# Patient Record
Sex: FEMALE | Race: WHITE | Employment: FULL TIME | URBAN - METROPOLITAN AREA
[De-identification: names, ages, dates, MRNs, and addresses within clinical notes are randomized per-mention and may not be internally consistent; named-entity substitution may affect disease eponyms.]

---

## 2018-03-29 ENCOUNTER — OFFICE VISIT (OUTPATIENT)
Dept: URGENT CARE | Facility: RETAIL CLINIC | Age: 53
End: 2018-03-29
Payer: COMMERCIAL

## 2018-03-29 VITALS — DIASTOLIC BLOOD PRESSURE: 78 MMHG | TEMPERATURE: 97.5 F | HEART RATE: 63 BPM | SYSTOLIC BLOOD PRESSURE: 119 MMHG

## 2018-03-29 DIAGNOSIS — R09.81 NASAL SINUS CONGESTION: Primary | ICD-10-CM

## 2018-03-29 DIAGNOSIS — G44.52 NEW DAILY PERSISTENT HEADACHE: ICD-10-CM

## 2018-03-29 PROCEDURE — 99213 OFFICE O/P EST LOW 20 MIN: CPT | Performed by: PHYSICIAN ASSISTANT

## 2018-03-29 NOTE — MR AVS SNAPSHOT
"              After Visit Summary   3/29/2018    Regina Roblero    MRN: [id number]           Patient Information     Date Of Birth          1965        Visit Information        Provider Department      3/29/2018 7:10 PM Meagan Ewing PA-C [facility]        Today's Diagnoses     Nasal sinus congestion    -  1    New daily persistent headache           Follow-ups after your visit        Who to contact     You can reach your care team any time of the day by calling [phone].  Notification of test results:  If you have an abnormal lab result, we will notify you by phone as soon as possible.         Additional Information About Your Visit        MyChart Information     Zannel lets you send messages to your doctor, view your test results, renew your prescriptions, schedule appointments and more. To sign up, go to [email or web address] . Click on \"Log in\" on the left side of the screen, which will take you to the Welcome page. Then click on \"Sign up Now\" on the right side of the page.     You will be asked to enter the access code listed below, as well as some personal information. Please follow the directions to create your username and password.     Your access code is: [email or web address]  Expires: 2018  7:33 PM     Your access code will  in 90 days. If you need help or a new code, please call your [place] clinic or [phone].        Care EveryWhere ID     This is your [facility] EveryWhere ID. This could be used by other organizations to access your [place] medical records  [id number]        Your Vitals Were     Pulse Temperature                63 97.5  F (36.4  C) (Temporal)           Blood Pressure from Last 3 Encounters:   18 119/78   16 111/69   13 118/76    Weight from Last 3 Encounters:   No data found for Wt              Today, you had the following     No orders found for display       Primary Care Provider Office Phone # Fax #    Alin Quispe, " [phone]       [facility] [address] [place]  [place] [address]        Equal Access to Services     IVONNE LANGE : Hadii aad ku hadangelitanaldo Acosta, melepro regannatashaha, sadia jamirventura alejandro, waxpadmini adrianin hayaasoo bennettgrace dang harriet sherwood. So [facility] [phone].    ATENCIÓN: Si habla español, tiene a swann disposición servicios gratuitos de asistencia lingüística. Llame al [phone].    We comply with applicable federal civil rights laws and [place] laws. We do not discriminate on the basis of race, color, national origin, age, disability, sex, sexual orientation, or gender identity.            Thank you!     Thank you for choosing [facility] [place]  for your care. Our goal is always to provide you with excellent care. Hearing back from our patients is one way we can continue to improve our services. Please take a few minutes to complete the written survey that you may receive in the mail after your visit with us. Thank you!             Your Updated Medication List - Protect others around you: Learn how to safely use, store and throw away your medicines at www.disposemymeds.org.          This list is accurate as of 3/29/18  7:33 PM.  Always use your most recent med list.                   Brand Name Dispense Instructions for use Diagnosis    ADVIL COLD/SINUS PO           albuterol 108 (90 BASE) MCG/ACT Inhaler    PROAIR HFA/PROVENTIL HFA/VENTOLIN HFA    1 Inhaler    Inhale 2 puffs into the lungs every 6 hours as needed for shortness of breath / dyspnea.    Cough       ALEVE PO           IBUPROFEN PO      Take  by mouth.

## 2018-03-30 NOTE — NURSING NOTE
Chief Complaint   Patient presents with     Sinus Problem     x 3 1/2 week, sinus pain and pressure, bilateral ear pain x 3 days, no fevers       Initial /78 (BP Location: Left arm)  Pulse 63  Temp 97.5  F (36.4  C) (Temporal) There is no height or weight on file to calculate BMI.  Medication Reconciliation: complete

## 2019-07-24 ENCOUNTER — OFFICE VISIT (OUTPATIENT)
Dept: URGENT CARE | Facility: RETAIL CLINIC | Age: 54
End: 2019-07-24
Payer: COMMERCIAL

## 2019-07-24 VITALS — SYSTOLIC BLOOD PRESSURE: 143 MMHG | TEMPERATURE: 98.3 F | DIASTOLIC BLOOD PRESSURE: 83 MMHG | HEART RATE: 66 BPM

## 2019-07-24 DIAGNOSIS — L23.7 CONTACT DERMATITIS DUE TO POISON IVY: Primary | ICD-10-CM

## 2019-07-24 PROCEDURE — 99213 OFFICE O/P EST LOW 20 MIN: CPT | Performed by: FAMILY MEDICINE

## 2019-07-24 RX ORDER — ESTRADIOL 1 MG/1
1 TABLET ORAL DAILY
Refills: 3 | COMMUNITY
Start: 2019-06-20

## 2019-07-24 RX ORDER — TOPIRAMATE 25 MG/1
TABLET, FILM COATED ORAL
Refills: 2 | COMMUNITY
Start: 2018-10-16

## 2019-07-24 RX ORDER — CLOBETASOL PROPIONATE 0.5 MG/G
OINTMENT TOPICAL
COMMUNITY
Start: 2019-06-20

## 2019-07-24 RX ORDER — OXYCODONE AND ACETAMINOPHEN 5; 325 MG/1; MG/1
TABLET ORAL
Refills: 0 | COMMUNITY
Start: 2019-04-22

## 2019-07-24 RX ORDER — PREDNISONE 20 MG/1
TABLET ORAL
Qty: 15 TABLET | Refills: 0 | Status: SHIPPED | OUTPATIENT
Start: 2019-07-24

## 2019-07-24 RX ORDER — LATANOPROST 50 UG/ML
SOLUTION/ DROPS OPHTHALMIC
Refills: 0 | COMMUNITY
Start: 2019-06-05

## 2019-07-24 RX ORDER — OMEPRAZOLE 40 MG/1
CAPSULE, DELAYED RELEASE ORAL
Refills: 0 | COMMUNITY
Start: 2018-10-26

## 2019-07-25 NOTE — PROGRESS NOTES
SUBJECTIVE:  Regina Roblero is a 53 year old female who presents to the clinic today for a rash on her arms.    Recent exposure history: poison ivy    Associated symptoms include: nothing.    No past medical history on file.  Current Outpatient Medications   Medication Sig Dispense Refill     clobetasol (TEMOVATE) 0.05 % external ointment        estradiol (ESTRACE) 1 MG tablet Take 1 mg by mouth daily  3     latanoprost (XALATAN) 0.005 % ophthalmic solution INSTILL 1 DROP INTO BOTH EYES AT BEDTIME  0     predniSONE (DELTASONE) 20 MG tablet 2 daily for 5 days. 1 daily for 5 days 15 tablet 0     Pseudoephedrine-Ibuprofen (ADVIL COLD/SINUS PO)        albuterol (PROVENTIL HFA: VENTOLIN HFA) 108 (90 BASE) MCG/ACT inhaler Inhale 2 puffs into the lungs every 6 hours as needed for shortness of breath / dyspnea. (Patient not taking: Reported on 3/29/2018) 1 Inhaler 1     IBUPROFEN PO Take  by mouth.       Naproxen Sodium (ALEVE PO)        omeprazole (PRILOSEC) 40 MG DR capsule TAKE ONE CAPSULE BY MOUTH TWICE A DAY FOR 1 WEEK, THEN REDUCE DOWN TO ONCE DAILY FOR 3 WEEKS  0     oxyCODONE-acetaminophen (PERCOCET) 5-325 MG tablet TAKE ONE TO TWO TABLETS BY MOUTH EVERY 4 HOURS AS NEEDED FOR MODERATE PAIN *CAUTION: OPIOID. RISK OF OVERDOSE AND ADDICTION.  0     topiramate (TOPAMAX) 25 MG tablet START WITH 1 TABLET BY MOUTH FOR 7 DAYS THEN INCREASE TO 1 TABLET TWICE DAILY FOR 7 DAYS, THEN INCREASE TO 1 TABLET IN THE MORNING AND 2 TAB  2     TRANSDERM-SCOP, 1.5 MG, 1 MG/3DAYS 72 hr patch APPLY 1 PATCH ONTO DRY,CLEAN,HAIRLESS SKIN EVERY 72 HOURS  0     History   Smoking Status     Never Smoker   Smokeless Tobacco     Never Used       ROS:  Review of systems negative except as stated above.    EXAM:   /83 (BP Location: Right arm)   Pulse 66   Temp 98.3  F (36.8  C) (Oral)   GENERAL: alert, no acute distress.  SKIN: Rash description:    Red maculopapular rash both forearms, worst lt.    ASSESSMENT:  Poison  ivy    PLAN:  Tapering prednisone, has clobetasole at home for lichen planus, but has not been using on rash.  Advised to do so.  Follow up with primary care provider if no improvement.

## 2020-05-12 ENCOUNTER — APPOINTMENT (OUTPATIENT)
Dept: GENERAL RADIOLOGY | Facility: CLINIC | Age: 55
End: 2020-05-12
Attending: EMERGENCY MEDICINE
Payer: COMMERCIAL

## 2020-05-12 ENCOUNTER — HOSPITAL ENCOUNTER (EMERGENCY)
Facility: CLINIC | Age: 55
Discharge: HOME | End: 2020-05-12
Attending: EMERGENCY MEDICINE | Admitting: EMERGENCY MEDICINE
Payer: COMMERCIAL

## 2020-05-12 VITALS
RESPIRATION RATE: 18 BRPM | HEART RATE: 74 BPM | DIASTOLIC BLOOD PRESSURE: 86 MMHG | SYSTOLIC BLOOD PRESSURE: 127 MMHG | TEMPERATURE: 98.1 F | OXYGEN SATURATION: 99 % | WEIGHT: 155 LBS

## 2020-05-12 DIAGNOSIS — S60.00XA CONTUSION OF FINGER OF RIGHT HAND, UNSPECIFIED FINGER, INITIAL ENCOUNTER: ICD-10-CM

## 2020-05-12 PROCEDURE — 99284 EMERGENCY DEPT VISIT MOD MDM: CPT | Performed by: EMERGENCY MEDICINE

## 2020-05-12 PROCEDURE — 73140 X-RAY EXAM OF FINGER(S): CPT | Mod: TC,RT

## 2020-05-12 PROCEDURE — 29130 APPL FINGER SPLINT STATIC: CPT | Mod: F9 | Performed by: EMERGENCY MEDICINE

## 2020-05-12 PROCEDURE — 99282 EMERGENCY DEPT VISIT SF MDM: CPT | Mod: Z6 | Performed by: EMERGENCY MEDICINE

## 2020-05-12 NOTE — ED AVS SNAPSHOT
[facility] Emergency Department  [address]   [place] [address]  Phone:  [phone]  Fax:  [phone]                                    Regina Roblero   MRN: [id number]    Department:  [facility] Emergency Department   Date of Visit:  5/12/2020           After Visit Summary Signature Page    I have received my discharge instructions, and my questions have been answered. I have discussed any challenges I see with this plan with the nurse or doctor.    ..........................................................................................................................................  Patient/Patient Representative Signature      ..........................................................................................................................................  Patient Representative Print Name and Relationship to Patient    ..................................................               ................................................  Date                                   Time    ..........................................................................................................................................  Reviewed by Signature/Title    ...................................................              ..............................................  Date                                               Time          22EPIC Rev 08/18

## 2020-05-13 NOTE — ED PROVIDER NOTES
History     Chief Complaint   Patient presents with     Hand Pain     HPI  Regina Roblero is a 54 year old female who presents to the emergency department complaining of injury to the right fifth finger.  She was doing some landscaping and lifted up a [place] and it was too heavy and she tried to catch it and it smashed her right fifth finger in between that [place] and another [place].  It swelled up right away and she had difficulty with range of motion of her PIP joint.  She was not sure whether was broken or not so she decided to come here.  No other injuries.  She has normal sensation in the finger.  No bleeding or laceration.    Allergies:  Allergies   Allergen Reactions     Penicillins Other (See Comments)     Doesn't work        Problem List:    There are no active problems to display for this patient.       Past Medical History:    No past medical history on file.    Past Surgical History:    No past surgical history on file.    Family History:    No family history on file.    Social History:  Marital Status:  Single [1]  Social History     Tobacco Use     Smoking status: Never Smoker     Smokeless tobacco: Never Used   Substance Use Topics     Alcohol use: Not on file     Drug use: Not on file        Medications:    albuterol (PROVENTIL HFA: VENTOLIN HFA) 108 (90 BASE) MCG/ACT inhaler  clobetasol (TEMOVATE) 0.05 % external ointment  estradiol (ESTRACE) 1 MG tablet  IBUPROFEN PO  latanoprost (XALATAN) 0.005 % ophthalmic solution  Naproxen Sodium (ALEVE PO)  omeprazole (PRILOSEC) 40 MG DR capsule  oxyCODONE-acetaminophen (PERCOCET) 5-325 MG tablet  predniSONE (DELTASONE) 20 MG tablet  Pseudoephedrine-Ibuprofen (ADVIL COLD/SINUS PO)  topiramate (TOPAMAX) 25 MG tablet  TRANSDERM-SCOP, 1.5 MG, 1 MG/3DAYS 72 hr patch          Review of Systems   All other systems reviewed and are negative.      Physical Exam   BP: 127/86  Pulse: 74  Temp: 98.1  F (36.7  C)  Resp: 18  Weight: 70.3 kg (155 lb)  SpO2: 99  %      Physical Exam  Vitals signs and nursing note reviewed.   Constitutional:       General: She is not in acute distress.     Appearance: She is well-developed. She is not diaphoretic.   HENT:      Head: Normocephalic and atraumatic.      Nose: Nose normal.      Mouth/Throat:      Mouth: Mucous membranes are moist.   Eyes:      General: No scleral icterus.  Neck:      Musculoskeletal: Normal range of motion and neck supple.   Cardiovascular:      Rate and Rhythm: Normal rate.   Pulmonary:      Effort: Pulmonary effort is normal.   Musculoskeletal:         General: Swelling and tenderness present.      Comments: Decreased range of motion of the right fifth PIP joint with swelling and tenderness over that joint and over the middle phalanx of the fifth finger.  No bruising or laceration.   Skin:     General: Skin is warm and dry.      Coloration: Skin is not pale.      Findings: No erythema or rash.   Neurological:      General: No focal deficit present.      Mental Status: She is alert and oriented to person, place, and time.         ED Course        Procedures             Results for orders placed or performed during the hospital encounter of 05/12/20 (from the past 24 hour(s))   XR Finger Right G/E 2 Views    Narrative    EXAM: XR FINGER RT G/E 2 VW  LOCATION: [facility]  DATE/TIME: 5/12/2020 6:25 PM    INDICATION: Pain.  COMPARISON: None.      Impression    IMPRESSION: Normal joint spaces and alignment. No fracture.         Medications - No data to display    Assessments & Plan (with Medical Decision Making)  54-year-old female with right fifth finger contusion.  No fracture on x-ray.  The patient was given a splint and told to rest ice ibuprofen and follow-up if things worsen.     I have reviewed the nursing notes.    I have reviewed the findings, diagnosis, plan and need for follow up with the patient.      New Prescriptions    No medications on file       Final diagnoses:   Contusion of finger  of right hand, unspecified finger, initial encounter       5/12/2020   [facility] EMERGENCY DEPARTMENT     Danielito Moore MD  05/12/20 1917